# Patient Record
Sex: FEMALE | Race: BLACK OR AFRICAN AMERICAN
[De-identification: names, ages, dates, MRNs, and addresses within clinical notes are randomized per-mention and may not be internally consistent; named-entity substitution may affect disease eponyms.]

---

## 2018-06-19 ENCOUNTER — HOSPITAL ENCOUNTER (EMERGENCY)
Dept: HOSPITAL 68 - ERH | Age: 63
End: 2018-06-19
Payer: SELF-PAY

## 2018-06-19 VITALS — BODY MASS INDEX: 25.71 KG/M2 | HEIGHT: 66 IN | WEIGHT: 160 LBS

## 2018-06-19 VITALS — SYSTOLIC BLOOD PRESSURE: 112 MMHG | DIASTOLIC BLOOD PRESSURE: 83 MMHG

## 2018-06-19 DIAGNOSIS — J06.9: Primary | ICD-10-CM

## 2018-06-19 DIAGNOSIS — J04.0: ICD-10-CM

## 2018-06-19 NOTE — ED THROAT/DENTAL COMPLAINT
History of Present Illness
 
General
Chief Complaint: General Adult
Stated Complaint: "HAVN'T BEEN FEELING GOOOD MY VOICE IS LOST"
Source: patient
Exam Limitations: no limitations
 
Vital Signs & Intake/Output
Vital Signs & Intake/Output
 Vital Signs
 
 
Date Time Temp Pulse Resp B/P B/P Pulse O2 O2 Flow FiO2
 
     Mean Ox Delivery Rate 
 
06/19 1913 98.9 101 18 112/83  97 Room Air  
 
 
 
Allergies
Coded Allergies:
latex (UNKNOWN 06/19/18)
 
Reconcile Medications
Albuterol Sulfate (Ventolin Hfa) 90 MCG HFA.AER.AD   2 PUF INH Q4-6 PRN PRN 
SHORTNESS OF BREATH
Augmentin (Augmentin 500-125 Tablet) 500 MG-125 MG TABLET   1 TAB PO BID PRN uri
Benzonatate (Tessalon Perle) 100 MG CAPSULE   1 CAP PO TID PRN COUGH
Fluticasone Propionate (Flonase Allergy Relief) 50 MCG/ACTUATION SPRAY.SUSP   2 
SPRAY R[place] DAILY PRN CONGESTION
[MAGIC MOUTHWASH] 10 ML PO TID PRN SORE THROAT
     1:1:1
     EQUAL PARTS
     SWISH AND SWALLOW
Methylprednisolone. (Medrol) 4 MG TAB.DS.PK   1 DP PO AD INFLAMMATION
     6 on day 1 then reduce by one tablet daily until gone
 
Triage Note:
PT TO TRIAGE C/O NOT FEELING WELL SINCE SATURDAY,
 RUNNY NOSE, +COUGH, AND LOST VOICE.
Triage Nurses Notes Reviewed? yes
Onset: Gradual
Duration: constant
Timing: recent history
Injury Environment: home
Severity: moderate
HPI:
Patient 63-year-old female with a past medical history of diabetes and 
hypertension who presents emergency room with a 3-day history of sore throat 
nasal congestion nonproductive cough hoarseness in the throat runny nose and 
mild shortness of breath due to coughing,  tactile fevers.  Denies any chest 
pain arm pain jaw pain ear pain difficulty swallowing or breathing
(Satinder Hernandez)
 
Past History
 
Travel History
Traveled to [place] past 21 day No
 
Medical History
Any Pertinent Medical History? see below for history
Neurological: NONE
EENT: NONE
Cardiovascular: NONE
Respiratory: NONE
Gastrointestinal: NONE
Hepatic: NONE
Renal: NONE
Musculoskeletal: NONE
Psychiatric: NONE
Endocrine: PREDIABETES
Blood Disorders: NONE
Cancer(s): NONE
GYN/Reproductive: NONE
 
Surgical History
Surgical History: non-contributory
 
Psychosocial History
What is your primary language English
Tobacco Use: Refused to answer
 
Family History
Hx Contributory? No
(Satinder Hernandez)
 
Review of Systems
 
Review of Systems
Constitutional:
Reports: see HPI. 
EENTM:
Reports: see HPI. 
Respiratory:
Reports: see HPI, cough. 
Cardiovascular:
Reports: no symptoms. 
GI:
Reports: no symptoms. 
Genitourinary:
Reports: no symptoms. 
Musculoskeletal:
Reports: no symptoms. 
Skin:
Reports: no symptoms. 
Neurological/Psychological:
Reports: no symptoms. 
Hematologic/Endocrine:
Reports: no symptoms. 
Immunologic/Allergic:
Reports: no symptoms. 
All Other Systems: Reviewed and Negative
(Satinder Hernandez)
 
Physical Exam
 
Physical Exam
General Appearance: no apparent distress, alert, comfortable
Head: atraumatic
Eyes:
Bilateral: normal appearance, PERRL, EOMI. 
Ears:
Bilateral: canal normal, Tympanic normal. 
Nose: normal inspection, nasal congestion
Mouth/Throat: normal mouth inspection, pharynx normal, voice changes
Neck: normal inspection, no midline tenderness
Cardiovascular/Respiratory: normal breath sounds, normal peripheral pulses, 
regular rate/rhythm, no respiratory distress
Neurologic/Psych: no motor/sensory deficits, awake, alert
Skin: intact, normal color, warm/dry
Comments:
Sinus-mild right maxillary sinus point tenderness
 
Core Measures
ACS in differential dx? No
Sepsis Present: No
Sepsis Focused Exam Completed? No
(Satinder Hernandez)
 
Progress
Differential Diagnosis: epiglottitis, Ludwigs angina, meningitis, odontogenic 
abscess, marry-tonsillar abscess, pharyngeal for. body, stomatitis/gingivitis, 
strep pharyngitis, tooth fracture
Plan of Care:
 Orders
 
 
Procedure Date/time Status
 
THROAT CULTURE W/QUICK STREP 06/19 2012 Active
 
 
Differential diagnosis include pneumonia laryngitis sinusitis
 
Patient on initial presentation was afebrile nontoxic-appearing clear lungs 
auscultation central criteria 0
 
Patient will be treated for concerns of URI and laryngitis
 
No concerns of peritonsillar abscess or Don angina
 no respiratory distress 
 
Strep was negative culture pending
(Satinder Hernandez)
 
Departure
 
Departure
Disposition: HOME OR SELF CARE
Condition: Stable
Clinical Impression
Primary Impression: URI (upper respiratory infection)
Secondary Impressions: Laryngitis
Referrals:
Jessica XIE,Myl (PCP/Family)
 
Additional Instructions:
As discussed begin the prescription of Tessalon Perles for cough Magic mouthwash
for sore throat Medrol Dosepak for inflammation Flonase for congestion Augmentin
for the full course and Ventolin inhaler for shortness of breath.
If symptoms worsen or if you develop any new concerning symptoms return to the 
emergency room if no better in 2 days follow-up with your doctor
Departure Forms:
Customer Survey
General Discharge Information
Prescriptions:
Current Visit Scripts
Augmentin (Augmentin 500-125 Tablet) 1 TAB PO BID PRN uri 
     #14 TAB 
 
Methylprednisolone. (Medrol) 1 DP PO AD  
     #1 DP 
     6 on day 1 then reduce by one tablet daily until gone
 
Fluticasone Propionate (Flonase Allergy Relief) 2 SPRAY RICO DAILY PRN CONGESTION
 
     #1 BOT 
 
Albuterol Sulfate (Ventolin Hfa) 2 PUF INH Q4-6 PRN PRN SHORTNESS OF BREATH 
     #1 INHAL 
 
[MAGIC MOUTHWASH] 10 ML PO TID PRN SORE THROAT 
     #100 ML 
     1:1:1
     EQUAL PARTS
     SWISH AND SWALLOW
 
Benzonatate (Tessalon Perle) 1 CAP PO TID PRN COUGH 
     #15 CAP 
 
 
(Satinder Hernandez)
 
PA/NP Co-Sign Statement
Statement:
ED Attending supervision documentation-
 
[] I saw and evaluated the patient. I have also reviewed all the pertinent lab 
results and diagnostic results. I agree with the findings and the plan of care 
as documented in the PA's/NP's documentation. 
 
[X] I have reviewed the ED Record and agree with the PA's/NP's documentation.
 
[] Additions or exceptions (if any) to the PAs/NP's note and plan are 
summarized below:
[]
 
(Jacek XIE,Ronny PUENTE)

## 2018-07-05 ENCOUNTER — HOSPITAL ENCOUNTER (OUTPATIENT)
Dept: HOSPITAL 68 - ERH | Age: 63
Setting detail: OBSERVATION
LOS: 1 days | End: 2018-07-06
Attending: INTERNAL MEDICINE | Admitting: INTERNAL MEDICINE
Payer: COMMERCIAL

## 2018-07-05 VITALS — HEIGHT: 64 IN | BODY MASS INDEX: 36.95 KG/M2 | WEIGHT: 216.44 LBS

## 2018-07-05 VITALS — DIASTOLIC BLOOD PRESSURE: 70 MMHG | SYSTOLIC BLOOD PRESSURE: 140 MMHG

## 2018-07-05 DIAGNOSIS — I10: ICD-10-CM

## 2018-07-05 DIAGNOSIS — G45.9: ICD-10-CM

## 2018-07-05 DIAGNOSIS — I63.9: Primary | ICD-10-CM

## 2018-07-05 DIAGNOSIS — R29.810: ICD-10-CM

## 2018-07-05 LAB
ABSOLUTE GRANULOCYTE CT: 2.6 /CUMM (ref 1.4–6.5)
BASOPHILS # BLD: 0 /CUMM (ref 0–0.2)
BASOPHILS NFR BLD: 0.5 % (ref 0–2)
EOSINOPHIL # BLD: 0.2 /CUMM (ref 0–0.7)
EOSINOPHIL NFR BLD: 2.7 % (ref 0–5)
ERYTHROCYTE [DISTWIDTH] IN BLOOD BY AUTOMATED COUNT: 13.8 % (ref 11.5–14.5)
GRANULOCYTES NFR BLD: 37.4 % (ref 42.2–75.2)
HCT VFR BLD CALC: 40.1 % (ref 37–47)
LYMPHOCYTES # BLD: 3.7 /CUMM (ref 1.2–3.4)
MCH RBC QN AUTO: 29.8 PG (ref 27–31)
MCHC RBC AUTO-ENTMCNC: 33.1 G/DL (ref 33–37)
MCV RBC AUTO: 90.1 FL (ref 81–99)
MONOCYTES # BLD: 0.4 /CUMM (ref 0.1–0.6)
PLATELET # BLD: 210 /CUMM (ref 130–400)
PMV BLD AUTO: 9.8 FL (ref 7.4–10.4)
RED BLOOD CELL CT: 4.45 /CUMM (ref 4.2–5.4)
WBC # BLD AUTO: 7 /CUMM (ref 4.8–10.8)

## 2018-07-05 PROCEDURE — G0378 HOSPITAL OBSERVATION PER HR: HCPCS

## 2018-07-05 NOTE — ULTRASOUND REPORT
EXAMINATION:
US DUPLEX CAROTID AND VERTEBRAL
 
CLINICAL INFORMATION:
Slurred speech
 
COMPARISON:
None
 
TECHNIQUE:
Real-time ultrasound and Doppler techniques (integrating B-mode 2D vascular
images, Doppler spectral analysis and color flow Doppler imaging) were
utilized to interrogate the extracranial carotid and vertebral arteries
bilaterally. The degree of stenosis determined by criteria similar to NASCET.
 
 
FINDINGS:
 
No measurable plaque formation.
 
INTERNAL CAROTID PEAK SYSTOLIC VELOCITY:
 
RIGHT: 1:15 cm/sec      LEFT: 85 cm/sec
 
ICA PEAK END-DIASTOLIC VELOCITY:
 
RIGHT: 33                   LEFT: 33
 
ADDITIONAL FINDINGS: There is antegrade flow in the vertebral arteries.
 
IMPRESSION:
No hemodynamically significant stenosis.
 
No focal stenosis

## 2018-07-05 NOTE — CT SCAN REPORT
EXAMINATION:
CT HEAD WITHOUT CONTRAST
 
CLINICAL INFORMATION:
Intracranial hemorrhage, mass. Slurred speech
 
COMPARISON:
None
 
TECHNIQUE:
Contiguous axial imaging was performed from the skull base to vertex without
intravenous administration of contrast.
 
DLP:
615 mGy-cm
 
FINDINGS:
There is no evidence of acute intracranial hemorrhage or territorial
infarction. No abnormal mass effect or midline shift is seen. Gray to white
matter differentiation is well preserved. No extra-axial fluid collections
are identified.
 
The ventricles are normal in size. There is no abnormal attenuation within
the brain parenchyma. The osseous structures and soft tissues are normal. The
mastoid air cells and visualized portions of the paranasal sinuses are well
aerated.
 
IMPRESSION:
No acute intracranial pathology.

## 2018-07-05 NOTE — HISTORY & PHYSICAL
Paty Ocampo 07/05/18 1743:
General Information and HPI
Source of Information: patient
Exam Limitations: no limitations
History of Present Illness:
Patient is a 63-year-old female with a past medical history of hypertension, non
-insulin-dependent diabetes mellitus.  She presented to the ED because as per 
her daughter her speech was slurred.  The slurring of the speech was noticed 
first on Monday, July 2 from 5 to 6 PM & Tuesday all day.  Wednesday she felt 
that her speech improved.
Patient also noticed tiredness, numbness, pins and needles in her left arm and 
hand.  Her little finger of her left hand had no sensation.  The sensory 
sensation improved on Tuesday but still not back to normal.
Patient reports an upper respiratory tract infection (laryngitis) 2 weeks ago. 
She used Augmentin 500/250 for 14 days and prednisone. 
 
On review of symptoms, she was feeling very tired.  She has been eating and 
drinking well.No other complaints. 
 
 
 
 
 
Allergies/Medications
Allergies:
Coded Allergies:
latex (UNKNOWN 06/19/18)
morphine (UNKNOWN 07/05/18)
 
Home Med list
Albuterol Sulfate (Proair Hfa) 90 MCG HFA.AER.AD   2 PUF INH Q4-6 PRN PRN 
SHORTNESS OF BREATH  (Reported)
Aspirin (Ecotrin*) 81 MG TABLET.DR   81 MG PO DAILY heart
Atenolol 25 MG TABLET   1 TAB PO BID HEART  (Reported)
Atorvastatin Calcium 80 MG TABLET   80 MG PO 1700 heart
Cyclobenzaprine HCl 5 MG TABLET   0.5 TAB PO TIDPRN PRN MUSCLE RELAXER  (
Reported)
Liraglutide (Victoza 2-Amadeo) 0.6 MG/0.1 ML (18 MG/3 ML) PEN.INJCTR   1.2 MG SC 
DAILY DIABETES  (Reported)
Mometasone Furoate (Nasonex) 50 MCG SPRAY.PUMP   1 SPRAY NASB DAILY PRN 
BREATHING  (Reported)
Ranitidine HCl (Zantac) 150 MG TABLET   1 TAB PO BID PRN GI  (Reported)
 
 
Past History
 
Travel History
Traveled to [place] past 21 day No
 
Medical History
Neurological: NONE
EENT: NONE
Cardiovascular: hypertension
Respiratory: NONE
Gastrointestinal: NONE
Hepatic: NONE
Renal: NONE
Musculoskeletal: NONE
Psychiatric: NONE
Endocrine: PREDIABETES
Blood Disorders: NONE
Cancer(s): NONE
GYN/Reproductive: NONE
 
Surgical History
Surgical History: non-contributory
 
Past Family/Social History
 
Family History
Relations & Conditions if any
Relation not specified for:
  Diabetes mellitus in father
  Diabetes mellitus in son
  Stroke or transient ischemic attack in mother
 
 
Psychosocial History
ETOH Use: denies use
 
Review of Systems
 
Review of Systems
Constitutional:
Reports: see HPI. 
 
Exam & Diagnostic Data
Last 24 Hrs of Vital Signs/I&O
 Vital Signs
 
 
Date Time Temp Pulse Resp B/P B/P Pulse O2 O2 Flow FiO2
 
     Mean Ox Delivery Rate 
 
07/05 1738 98.3 72 18 124/65  97   
 
07/05 1509  69 15 118/63  99   
 
07/05 1423 98.0 84 16 156/74  99 Room Air  
 
 
 
 
Physical Exam
General Appearance Alert, Oriented X3, Cooperative
Skin No Rashes, No Breakdown, No Significant Lesion
Skin Temp/Moisture Exam: Cool/Dry
Sepsis Skin Exam (color): Normal for Ethnicity
HEENT Atraumatic, PERRLA, lt eye is prosthetic
Neck Supple
Cardiovascular Regular Rate, Normal S1, Normal S2
Lungs Clear to Auscultation, Normal Air Movement
Abdomen Normal Bowel Sounds, Soft, No Tenderness
Neurological Normal Speech, Strength at 5/5 X4 Ext, Cranial Nerves 3-12 NL
Extremities No Clubbing, No Cyanosis, No Edema, No drift
 
Assessment/Plan
Assessment:
Patient is a 63-year-old female with a past medical history of hypertension, non
-insulin-dependent diabetes mellitus.  She presented to the ED because as per 
her daughter her speech was slurred.  No slurring of the speech was noticed 
first on Monday, July 2 from 5 to 6 PM & Tuesday all day.  Wednesday she felt 
that her speech improved.
Patient also noticed tiredness, numbness, pins and needles in her left arm and 
hand.  Her little finger of her left hand had no sensation.  This sensation 
improved on Tuesday but still not back to normal.
 
In the ED her vitals were temp 98, pulse 84, respirations 16, /74, pulse 
ox 99% on room air.
EKG on admission: Deep R waves in anterior and inferior leads, 
CT head: Unremarkable
Pertinent labs: WBC 7, hemoglobin 13.3, hematocrit 40.1, platelets 210, sodium 
144, potassium 3.5, chloride 111, bicarb 22, BUN 13, creatinine 1.2, troponin 
less than 0.01 on admission. 
 
Problems: 
-Stroke/TIA ( MCA vs lacunar)
HTN
DM
? KARENA- 
 
Plan:
-Place in observation in tele
-neuro checks q2 hrs for 24 hrs
-cardiac monitor for arrythmias 
-trend trop and EKG
-start ASA and Statin daily
-as symptoms started more than 48hrs no need for permissive HTN, we will contine
atenolol and hold HTCZ in view Creatinine, restart if it improves.
-f/up ECHO, MRI and Carotid US
-f/up lipid panel, HA1c, TSH 
-neuro consult placed
-PT/OT to evaluate and treat
-passed bedside swallow 
-diet: diabetic
-dvt Pxx: ALP
-code status: Full code
 
 
Core Measures/Misc (9/17)
 
Acute Coronary Syndrome
ACS Diagnosis: No
 
Congestive Heart Failure
Congestive Heart Failure Diagnosis No
 
Cerebrovascular Accident
CVA/TIA Diagnosis: Yes
NIH Stroke Scale:
Total 1
Date Last Known Well: 07/02/18
Time Last Known Well: 1700
Symptom Start Date: 07/02/18
Symptom Start Time: 1700
tPA Risk/Benefit discussion
I have discussed the risks, benefits, and alternatives of Alteplase treatment 
including:
- If given promptly, can resolve or have major improvement in stroke symptoms.
- Bleeding (hemorrhage) is the most common risk that can occur.
- Bleeding may occur into the brain and cause~long term serious disability~
including death - this is rare, affecting about 1% of patients.
- Alternative treatments with proven benefit for patients with stroke include 
aspirin and care in a specialized unit where staff members pay careful attention
to a variety of basic aspects of care.
 
 
 
tPA given? No
Reason tPA not ordered Medical Contraindication (out of window period)
Swallow Evaluation Pass
Current/Past Hx AFib/AFlutter No
No Anticoagulant d/t Medical Contraindication
 
VTE (View Protocol)
VTE Risk Factors Age>40
No Mechanical VTE Prophylaxis d/t N/A MechProphylax Ordered
No VTE Pharm Prophylaxis d/t NA PharmProphylax ordered
 
Sepsis (View protocol)
Sepsis Present: No
If YES complete Sepsis Event Note If YES complete Sepsis Event Note
 
Shalom Ugalde 07/05/18 1945:
Assessment/Plan
 
As Ranked By This Provider
Problem List:
 1. CVA (cerebral vascular accident)
   Qualifiers
 CVA mechanism: unspecified Qualified Code: I63.9 - Cerebral infarction, 
unspecified
 
 
Core Measures/Misc (9/17)
 
Sepsis (View protocol)
If YES complete Sepsis Event Note If YES complete Sepsis Event Note
 
Resident Review Statement
Resident Statement: examined this patient, discussed with intern, discussed with
family
Other Findings:
63 year old woman, legally blind in left eye s/p prosthesis, with pmh 
significant for HTN, NIDDM, GERD,brought in for evaluation of slurred speech.  4
days ago in the the evening when she was tlking to her daughter on the phone, 
her daughter noticed that her speech was slurred, associated with Left arm 
heaviness and numbness. Her cousin who was at bedside saw her two days later did
notice any facial droop or any changes. On interview she endorsed left pinky 
numbness.ROS was pos for fatigue. Denies fevers, chills, shortness of breath, 
palpitations.
 
Two weeks prior she was seen in [facility] for URI and was prescribed augmentin 
and predn. taper.  She attributed her symptoms to her illness.  Reports 
compliance with her medication and monitors her BP at home and states that they 
are usually well controlled.  
 
 
In the ED her vitals were temp 98, pulse 84, respirations 16, /74, pulse 
ox 99% on room air. Examination:, slight slurring of speech, CN II-12 grossly 
normal, no facial droop,pronator drift  noted, decreased sensation to light 
touch on left side of face and arm. Power 5x5 in all extremities, no cerebellar 
signs noted, reflexes +2, babinski equivocal. Rest of examination unremarkable. 
 
 
EKG on admission: Deep R waves in anterior and inferior leads, 
CT head: Unremarkable
Pertinent labs: WBC 7, hemoglobin 13.3, hematocrit 40.1, platelets 210, sodium 
144, potassium 3.5, chloride 111, bicarb 22, BUN 13, creatinine 1.2 (baseline 
not known), troponin less than 0.01 on admission. 
 
Problem List:
Stroke/TIA ( MCA vs lacunar)
HTN
DM
? KARENA- 
 
 
plan:
Place as obs tele, neuro checks q2 hrs for 24 hrs,monitor for arrythmias, trend 
trop EKG
start ASA and STatin daily
as symptoms started more than 48hrs no need for permissive HTN, will contine 
atenolol and hold HTCZ in view Creatinine, restart if it improves.
f/up ECHO, MRI and Carotid US
f/up lipid panel, HA1c, TSH, 
neuro consult placed
PT/OT to evaluate and treat
passed bedside swallow, diabetic diet
dvt Pxx
full code
 
 
Magdalene Mercado MD 07/06/18 0047:
Core Measures/Misc (9/17)
 
Sepsis (View protocol)
If YES complete Sepsis Event Note If YES complete Sepsis Event Note
 
Attending MD Review Statement
 
Attending Statement
Attending MD Statement: examined this patient, discuss w/resident/PA/NP, agreed 
w/resident/PA/NP, reviewed EMR data (avail)
Attending Assessment/Plan:
63F PMH HTN, T2DM, with slurred speech that started 4 days ago and has been 
steadily improving, as well as left arm heaviness and paresthesia for the past 3
days.  Symptoms started abruptly, were noted by family members.  No speech 
slurring is noted today.  Still reports paresthesia of the left arm.  On exam, 
CN intact, strength intact, sensation intact.  EKG NSR.  Head CT negative.
 
1. Acute ischemic CVA
 
Plan
- Admit to telemetry
- MRI head
- Carotid doppler, echocardiogram
- Check lipids, HbA1c
- ASA, statin
- OT eval
- Continue home medications
- DVT PPx
 
- Continue home medications
- DVT PPx

## 2018-07-06 VITALS — DIASTOLIC BLOOD PRESSURE: 60 MMHG | SYSTOLIC BLOOD PRESSURE: 118 MMHG

## 2018-07-06 VITALS — DIASTOLIC BLOOD PRESSURE: 50 MMHG | SYSTOLIC BLOOD PRESSURE: 102 MMHG

## 2018-07-06 NOTE — PATIENT DISCHARGE INSTRUCTIONS
Discharge Instructions
 
General Discharge Information
You were seen/treated for:
STROKE
Watch for these problems:
In case of dizziness, numbness, slurring of speech, headache, chest pain please 
go to the nearest emergency room
Special Instructions:
#Please follow-up with your primary care provider within 1-2 weeks of discharge 
and let them know about your recent admission at [facility].
#Please have an ultrasound of your heart (echocardiogram) as an outpatient 
within 1-2 weeks of discharge
#Please see a pulmonologist for your cough
#Please follow-up with the neurologist within 1-2 weeks of discharge.
#Please note that 1 of your medication hydrochlorothiazide has been stopped 
during this hospital admission.  Please discuss with your primary care physician
regarding restarting it.
 
Diet
Recommended Diet: Heart Healthy
 
Activity
Activity Self Limited: No
 
Acute Coronary Syndrome
 
Inclusion Criteria
At DC or during hospital stay patient has or had the following:
ACS DIAGNOSIS No
 
Discharge Core Measures
Meds if any: Prescribed or Continued at Discharge
Meds if any: NOT Prescribed or Continued at Discharge
 
Congestive Heart Failure
 
Inclusion Criteria
At DC or during hospital stay patient has or had the following:
CHF DIAGNOSIS No
 
Discharge Core Measures
Meds if any: Prescribed or Continued at Discharge
Meds if any: NOT Prescribed or Continued at Discharge
 
Cerebrovascular accident
 
Inclusion Criteria
At DC or during hospital stay patient has or had the following:
CVA/TIA Diagnosis Yes
 
Discharge Core Measures
Meds if any: Prescribed or Continued at Discharge
Antithrombotic No
Statin (required if LDL =>70) Yes
Anticoagulant No
Meds if any: NOT Prescribed or Continued at Discharge
 
Venous thromboembolism
 
Inclusion Criteria
VTE Diagnosis No
VTE Type NONE
VTE Confirmed by (Test) NONE
 
Discharge Core Measures
- Per Current guidelines, there needs to be overlap
- treatment for the first 5 days of Warfarin therapy.
- If discharged on Warfarin prior to 5 days of
- overlap therapy, the patient will need to be
- assessed for post discharge needs including
- *Post discharge parental anticoagulation
- *Warfarin and/or parental anticoagulation education
- *Follow up date to check INR post discharge
At least 5 days overlap therapy as Inpatient No
Meds if any: Prescribed or Continued at Discharge
Note: Overlap Therapy is Warfarin and Anticoagulant
Meds if any: NOT Prescribed or Continued at Discharge

## 2018-07-06 NOTE — PN- HOUSESTAFF
Paty Ocampo 18 0654:
Subjective
Follow-up For:
Slurred speech
Weakness and numbness of left arm.
Complaints: no complaints
Tele-Events Since Last Visit:
Normal sinus rhythm, sinus bradycardia.  Heart rate 54-62.
Subjective:
Patient slept well.  No complaints/acute events.
 
Review of Systems
Constitutional:
Reports: see HPI. 
 
Objective
Last 24 Hrs of Vital Signs/I&O
 Vital Signs
 
 
Date Time Temp Pulse Resp B/P B/P Pulse O2 O2 Flow FiO2
 
     Mean Ox Delivery Rate 
 
 0952  60  102/50     
 
/ 0703 98.7 60 18 102/50  97 Room Air  
 
 2312 97.8 56 18 140/70  97 Room Air  
 
 2238  58  144/72     
 
 1738 98.3 72 18 124/65  97   
 
 1509  69 15 118/63  99   
 
 
 Intake & Output
 
 
  1600  0800  0000
 
Intake Total 550 100 100
 
Output Total   
 
Balance 550 100 100
 
    
 
Intake, Oral 550 100 100
 
Patient   216 lb
 
Weight   
 
Weight   Bed scale
 
Measurement   
 
Method   
 
 
 
 
Physical Exam
General Appearance: Alert, Oriented X3, Cooperative, No Acute Distress
Skin: No Rashes, No Breakdown, No Significant Lesion
Skin Temp/Moisture Exam: Cool/Dry
HEENT: Atraumatic
Cardiovascular: Regular Rate, Normal S1, Normal S2, No Murmurs
Lungs: Clear to Auscultation, Normal Air Movement
Abdomen: Normal Bowel Sounds, Soft, No Tenderness
Neurological: Normal Gait (She has no residual deficits), Normal Speech, 
Strength at 5/5 X4 Ext, Normal Tone, Sensation Intact, Cranial Nerves 3-12 NL
Extremities: No Clubbing, No Cyanosis, No Edema, Normal Pulses
 
Assessment/Plan
Assessment:
Patient is a 62-year-old -American female who presented to us with 
slurring of speech for 1 week and weakness and numbness of the left arm for 1 
week.  She has a past medical history of non-insulin-dependent diabetes mellitus
and hypertension.
 
Her vitals today were temperature 98.7, pulse of 60, respiratory rate 18, 10 2 x
50, 97% on room air.  On the cardiac monitor she was in normal sinus rhythm and 
sinus bradycardia.  Heart rate 54-62.
Her head CT did not show any apparent pathology.  Troponins were negative(0.01
0.010.01).  
Carotid ultrasound was normal.
 
Patient is in observation and plan is to discharge to home today
 
Plan: 
-Plan is to discharge today.

-Continue Aspirin and statin
-keep her bp in check and manage it well



brain Without contrast.  MRI brain was not done in the hospital because of the 
prosthetic eye.

Problem List:
 1. CVA (cerebral vascular accident)
 
Pain Ratin
Pain Location:
none
Pain Goal: Remain pain free
Pain Plan:
remain pain free
Tomorrow's Labs & Rationales:
Adelita Wilkerson MD 18 1143:
Attending MD Review Statement
 
Attending Statement
Attending MD Statement: examined this patient, discuss w/resident/PA/NP, agreed 
w/resident/PA/NP, reviewed EMR data (avail), discussed with nursing, discussed 
with case mgmt, amended to note
Attending Assessment/Plan:
Patient seen and examined.  Lying comfortably in bed not in any acute distress. 
No new complaints.  Speech is intact.  She has no gross focal deficits on 
examination.  Evaluation by the neurology service impression.  Given the 
chronicity of her symptoms prior to presentation she appears to have suffered a 
small stroke. Her Neurologic deficit has significantly resolved.
 
 
Plan:
-Follow-up echocardiogram.
-Patient will be discharged on aspirin and statin therapy.  She is to continue 
her atenolol and hydrochlorothiazide for blood pressure control.
-Apparently the radiology service is unable to confirm if patient's prostatic I 
is compatible with the current MRI machine.  In the absence of any acute 
neurologic deficits she may be discharged to the event no acutely concerning 
pathologies on her echocardiogram.  She will follow-up in the outpatient with 
her primary care provider.
-She reports having a chronic cough that has been going on for well over a 
month.  It is a nonproductive cough.  She denies any chest pain or shortness of 
breath.  Denies any smoking history.  She is not on an ACE inhibitor.  She has 
seen her primary care provider about this.  Cough has not resolved with use of 
antitussive therapy.  Examination she has been entry bilaterally.  Lungs are 
clear bilaterally.  Recommend follow-up with the pulmonology service as an 
outpatient.

## 2018-07-06 NOTE — EVENT NOTE
Event Note
Event Note:
Patient was seen by neurology who suggested send her home with follow-up with 
neurology and primary care physician as outpatient.  Patient did get her MRI 
done in view of prosthetic eye.  This was informed to the neurologist who 
suggested to do the MRI as outpatient.  Patient will bring her records from her 
eye doctor and do MRI as outpatient.  We will send her home with aspirin and 
statin.

## 2018-07-06 NOTE — CONS- NEUROLOGY
General Information and HPI
 
Consulting Request
Date of Consult: 07/06/18
Requested By:
Adelita Hoang MD
 
History of Present Illness:
63-year-old hypertensive, non-insulin-dependent diabetic female presented 4 days
ago with slurred speech, mild left facial droop and paresthesias of the left 
upper extremity.  A family member noted her speech disturbance and ultimately 
the patient presented for medical evaluation.  When seen at the [facility]
, she was clearly out of the window for possible TPA.  In addition, her NIH  
stroke scale was low.  She has been stable since admission she is swallowing 
without difficulty and ambulating without appliance.  Initial workup shows a 
negative carotid ultrasound and a benign CT of the brain.
 
Allergies/Medications
Allergies:
Coded Allergies:
latex (UNKNOWN 06/19/18)
morphine (UNKNOWN 07/05/18)
 
Home Med List:
Albuterol Sulfate (Proair Hfa) 90 MCG HFA.AER.AD   2 PUF INH Q4-6 PRN PRN 
SHORTNESS OF BREATH  (Reported)
Atenolol 25 MG TABLET   1 TAB PO BID HEART  (Reported)
Cyclobenzaprine HCl 5 MG TABLET   0.5 TAB PO TIDPRN PRN MUSCLE RELAXER  (
Reported)
Hydrochlorothiazide 25 MG TABLET   1 TAB PO DAILY WATER RETENTION  (Reported)
Liraglutide (Victoza 2-Amadeo) 0.6 MG/0.1 ML (18 MG/3 ML) PEN.INJCTR   1.2 MG SC 
DAILY DIABETES  (Reported)
Mometasone Furoate (Nasonex) 50 MCG SPRAY.PUMP   1 SPRAY NASB DAILY PRN 
BREATHING  (Reported)
Ranitidine HCl (Zantac) 150 MG TABLET   1 TAB PO BID PRN GI  (Reported)
 
 
Review of Systems
Review of Systems:
Notable for dysarthria and paresthesia of the left upper extremity.  She had a 
recent URI several weeks back.  There has been no fever, chills, rash, weight 
loss, diplopia, dysphagia, chest pain, cough, vomiting, vertigo, gait ataxia or 
abnormal bleeding
 
Past History
 
Travel History
Traveled to [place] past 21 day No
 
Medical History
Blood Transfusion Hx: No
Neurological: NONE
EENT: NONE
Cardiovascular: hypertension
Respiratory: NONE
Gastrointestinal: NONE
Hepatic: NONE
Renal: NONE
Musculoskeletal: NONE
Psychiatric: NONE
Endocrine: PREDIABETES
Blood Disorders: NONE
Cancer(s): NONE
GYN/Reproductive: NONE
 
Surgical History
Surgical History: non-contributory
 
Family History
Relations & Conditions If Any:
Relation not specified for:
  Diabetes mellitus in father
  Diabetes mellitus in son
  Stroke or transient ischemic attack in mother
 
 
Psychosocial History
Smoking Status: Never Smoked
ETOH Use: denies use
 
Exam & Diagnostic Data
Vital Signs and I&O
Vital Signs
 
 
Date Time Temp Pulse Resp B/P B/P Pulse O2 O2 Flow FiO2
 
     Mean Ox Delivery Rate 
 
07/06 0952  60  102/50     
 
07/06 0703 98.7 60 18 102/50  97 Room Air  
 
07/05 2312 97.8 56 18 140/70  97 Room Air  
 
07/05 2238  58  144/72     
 
07/05 1738 98.3 72 18 124/65  97   
 
07/05 1509  69 15 118/63  99   
 
07/05 1423 98.0 84 16 156/74  99 Room Air  
 
 
 Intake & Output
 
 
 07/06 1600 07/06 0800 07/06 0000
 
Intake Total  100 100
 
Output Total   
 
Balance  100 100
 
    
 
Intake, Oral  100 100
 
Patient   216 lb
 
Weight   
 
Weight   Bed scale
 
Measurement   
 
Method   
 
 
Pleasant, middle-aged, obese female in no acute distress.  The head was 
normocephalic and atraumatic.  Higher cortical function was intact.  Speech was 
fluent.  The right pupil was 3 mm and reactive.  She had a prosthetic left eye 
with associated left-sided ptosis.  Facial sensation was intact.  There was 
minimal asymmetry to grimace with flattening of the left nasolabial fold.  
Tongue was midline; there was no dysarthria.  Motor examination showed no drift 
the upper extremities.  There was no focal or lateralizing weakness.  Deep 
tendon reflexes were symmetric.  Plantar responses were flexor.  Fine finger 
movements and rapid alternating movements were performed normally.  The sensory 
examination was normal to light touch.  Her gait was narrow based and steady.
 
Assessment/Plan
Assessment:
On clinical grounds, Ms. Mayen has likely suffered a small right hemispheric 
infarct resulting in transient dysarthria, paresthesia of the left upper 
extremity and minimal facial asymmetry.  I expect her to recover fully.
Recommendations:
The patient should undergo echocardiography and MRI of the brain without 
contrast in hopes of further documenting and localizing the lesion.  She should 
be maintained on aspirin and a statin going forward.  I have suggested that she 
be more vigilant in regard to her blood pressure with close follow-ups by her 
private medical doctor, Dr. Lopez.  She will not require any physical or 
occupational therapy after discharge.
 
Follow-up with neurology would be on an as-needed basis.
 
Please feel free to call with any further questions.
 
 
Consult Acknowledgment
- Thank you for your consult request.

## 2018-07-06 NOTE — ADMISSION CERTIFICATION
Admission Certification
 
Certification Statement
- As attending physician, I certify that at the time of
- admission, based on clinical presentation, severity of
- symptoms, need for further diagnostic testing and
- therapeutic interventions, and risk of adverse outcomes
- without in-hospital treatment, in my clinical assessment,
- this patient requires an acute hospital stay for a minimum
- of two nights or longer. I have also considered psychsocial
- factors such as support system, advanced age, financial
- issues, cognitive issues, and failed out-patient treatments,
- past re-admission history, safety of patient, and lack of
- compliance as applicable.
Specific rationale supporting this admission is:
Acute CVA

## 2018-07-06 NOTE — RADIOLOGY REPORT
EXAMINATION:
XR CHEST
 
CLINICAL INFORMATION:
Stroke/chronic cough.
 
COMPARISON:
None
 
TECHNIQUE:
2 views of the chest were obtained.
 
FINDINGS:
Both lungs are symmetrically expanded, appear clear. The cardiomediastinal
silhouette is within normal limit. There is no pleural effusion or
pneumothorax present. The visualized upper abdomen is unremarkable. Mild
multilevel degenerative changes are noted in the spine.
 
 
 
IMPRESSION:
No acute cardiopulmonary disease.

## 2018-07-06 NOTE — DISCHARGE SUMMARY
Hospital Course
Allergies:
Coded Allergies:
latex (UNKNOWN 06/19/18)
morphine (UNKNOWN 07/05/18)
 
 
Discharge Instructions
 
Medications at Discharge
Discharge Medications:
Stop taking the following medications:
Hydrochlorothiazide (Hydrochlorothiazide) 25 MG TABLET ORAL DAILY 
 
Continue taking these medications:
Liraglutide (Victoza 2-Amadeo) 0.6 MG/0.1 ML (18 MG/3 ML) PEN.INJCTR
    1.2 Milligram Inject into fatty tissue DAILY
    Comments:
       NOT GIVEN IN HOSPITAL.
 
Atenolol (Atenolol) 25 MG TABLET
    1 Tablet ORAL TWICE DAILY
    Comments:
       Last Taken: 07/06/18
             Time: 0900 AM
 
Ranitidine HCl (Zantac) 150 MG TABLET
    1 Tablet ORAL TWICE DAILY as needed for GI
    Comments:
       NOT GIVEN IN HOSPITAL.
 
Albuterol Sulfate (Proair Hfa) 90 MCG HFA.AER.AD
    2 Puff Inhale through mouth EVERY 4-6 HOURS AS NEEDED as needed for 
SHORTNESS OF BREATH
    Comments:
       NOT GIVEN IN HOSPITAL.
 
Mometasone Furoate (Nasonex) 50 MCG SPRAY.PUMP
    1 Spray Both sides of nose DAILY as needed for BREATHING
    Comments:
       NOT GIVEN IN HOSPITAL.
 
Cyclobenzaprine HCl (Cyclobenzaprine HCl) 5 MG TABLET
    0.5 Tablet ORAL THREE TIMES A DAY AS NEEDED as needed for MUSCLE RELAXER
    Comments:
       NOT GIVEN IN HOSPITAL.
 
Start taking the following new medications:
Atorvastatin Calcium (Atorvastatin Calcium) 80 MG TABLET
    80 Milligram ORAL 5 PM
    Qty = 30
    No Refills
    Comments:
       NOT GIVEN IN HOSPITAL.
 
Aspirin (Ecotrin*) 81 MG TABLET.
    81 Milligram ORAL DAILY
    Qty = 30
    No Refills
    Comments:
       Last Taken: 07/06/18
             Time: 0900 AM

## 2018-07-07 NOTE — ECHOCARDIOGRAM REPORT
ZARINA ANDINO 
 
 Age:    63     :    1955      Gender:     F 
 
 MRN:    [id number] 
 
 Exam Date:     2018  
                13:36 
 
 Exam Location:  
 [facility] 
 
 Ht (in):     64      Wt (lb):      207     BSA:    2.10 
 
 BP:          124     /     65 
 
 Ordering Physician:        Shalom Ugalde MD 
 
 Referring Physician:       Shalom Ugalde MD 
 
 Technologist:              Greyson [facility]a [facility] 
 
 Room Number:               174-02 
 
 Indications:       CVA 
 
 Rhythm:                 Sinus 
 
 Technical Quality:      fair 
 
 FINDINGS 
 
 Left Ventricle 
 Normal left ventricular size with mild left ventricular hypertrophy.   
 Normal systolic function with no obvious regional wall motion  
 abnormalities.  Normal left ventricular diastolic filling pattern  
 for age.  The ejection fraction is visually estimated at 70%. 
 
 Right Ventricle 
 The right ventricle is normal in size and function. 
 
 Right Atrium 
 The right atrium is normal in size. 
 
 Left Atrium 
 The left atrium is mildly enlarged.  The interatrial septum is  
 intact. 
 
 Mitral Valve 
 The mitral valve is normal in structure and function.  There is mild  
 mitral regurgitation. 
 
 Aortic Valve 
 Structurally normal aortic valve without significant sclerosis or  
 stenosis.  There is no aortic regurgitation. 
 
 Tricuspid Valve 
 The tricuspid valve is normal in structure and function.  There is  
 mild tricuspid regurgitation.  Pulmonary artery systolic pressure is  
 normal. 
 
 Pulmonic Valve 
 Structurally normal pulmonic valve.  There is no pulmonic  
 regurgitation. 
 
 Pericardium 
 Normal pericardium without effusion.  No pleural effusion. 
 
 Great Vessels 
 Normal aortic root dimension.  The aortic arch and great vessels are  
 well seen and are normal. 
 
 CONCLUSIONS 
 1. Normal EF of 70%. 
 2. Mild left ventricular hypertrophy. 
 3. Mild left atrial enlargement. 
 4. Mild mitral regurgitation. 
 5. Mild tricuspid regurgitation. 
 
 Justin Christina M.D. 
 (Electronically Signed) 
 Final Date:      2018  
                  07:06 
 
 MEASUREMENTS  (Male / Female) Normal Values 
 
 2D ECHO 
 LV Diastolic Diameter PLAX        4.2 cm                4.2 - 5.9 / 3.9 - 5.3 
cm 
 LV Systolic Diameter PLAX         2.4 cm                2.1 - 4.0 cm 
 LV Fractional Shortening PLAX     42.9 %                25 - 46  % 
 LV Ejection Fraction 2D Teich     74.3 %                 
 IVS Diastolic Thickness           1.4 cm                 
 LVPW Diastolic Thickness          1.2 cm                 
 LV Relative Wall Thickness        0.6                    
 LVOT Diameter                     1.9 cm                 
 Aortic Root Diameter              2.3 cm                 
 LA Systolic Diameter LX           4.2 cm                3.0 - 4.0 / 2.7 - 3.8 
cm 
 LV Ejection Fraction MOD BP       62.5 %                >= 55  % 
 LV Cardiac Index MOD BP           1475.9 cm/minm      
 LV Diastolic Length 4C            6.8 cm                6.9 - 10.3 cm 
 LV Diastolic Area 4C              27.1 cm               
 LV Diastolic Volume MOD 4C        88.0 cm               
 LV Ejection Fraction MOD 4C       68.2 %                 
 LV Stroke Volume MOD 4C           60.0 cm               
 LV Cardiac Index MOD 4C           1771.1 cm/minm      
 LV Systolic Length 4C             5.0 cm                 
 LV Systolic Area 4C               12.9 cm               
 LV Systolic Volume MOD 4C         28.0 cm               
 LV Ejection Fraction MOD 2C       57.4 %                 
 LV Cardiac Index MOD 2C           1151.2 cm/minm      
 LV Diastolic Volume 4C AL         91.8 cm              85 - 139 / 69 - 109 cm
 
 LV Systolic Volume 4C AL          28.1 cm               
 LV Ejection Fraction 4C AL        69.4 %                 
 LV Stroke Volume 4C AL            63.7 cm               
 LV Cardiac Index 4C AL            1879.7 cm/minm      
 LV Ejection Fraction 2C AL        58.5 %                 
 LV Cardiac Index 2C AL            1182.9 cm/minm      
 LA Volume                         54.0 cm              18 - 58 / 22 - 52 cm 
 Ascending Aorta Diameter          2.7 cm                 
 
 DOPPLER 
 AV Peak Velocity                  155.0 cm/s             
 AV Peak Gradient                  9.6 mmHg               
 LVOT Peak Velocity                97.7 cm/s              
 LVOT Peak Gradient                3.8 mmHg               
 AV Area Cont Eq pk                1.8 cm                
 Mitral E Point Velocity           103.0 cm/s             
 Mitral A Point Velocity           97.7 cm/s              
 Mitral E to A Ratio               1.1                    
 MV Deceleration Time              218.0 ms               
 TR Peak Velocity                  251.0 cm/s             
 TR Peak Gradient                  25.2 mmHg              
 PV Peak Velocity                  102.0 cm/s             
 PV Peak Gradient                  4.2 mmHg               
 LV E' Lateral Velocity            9.4 cm/s               
 Mitral E to LV E' Lateral Ratio   11.0                   
 LV E' Septal Velocity             7.0 cm/s               
 Mitral E to LV E' Septal Ratio    14.7

## 2024-01-08 NOTE — ED NEURO DEFICIT/STROKE
History of Present Illness
 
General
Chief Complaint: General Adult
Stated Complaint: "MY DAUGHTER SAYS MY SPEECH IS SLURRED"
Source: patient, family
Exam Limitations: no limitations
 
Vital Signs & Intake/Output
Vital Signs & Intake/Output
 Vital Signs
 
 
Date Time Temp Pulse Resp B/P B/P Pulse O2 O2 Flow FiO2
 
     Mean Ox Delivery Rate 
 
 1738 98.3 72 18 124/65  97   
 
 1509  69 15 118/63  99   
 
 1423 98.0 84 16 156/74  99 Room Air  
 
 
 
Allergies
Coded Allergies:
latex (UNKNOWN 18)
morphine (UNKNOWN 18)
 
Reconcile Medications
Albuterol Sulfate (Proair Hfa) 90 MCG HFA.AER.AD   2 PUF INH Q4-6 PRN PRN 
SHORTNESS OF BREATH  (Reported)
Atenolol 25 MG TABLET   1 TAB PO BID HEART  (Reported)
Cyclobenzaprine HCl 5 MG TABLET   0.5 TAB PO TIDPRN PRN MUSCLE RELAXER  (
Reported)
Hydrochlorothiazide 25 MG TABLET   1 TAB PO DAILY WATER RETENTION  (Reported)
Liraglutide (Victoza 2-Amadeo) 0.6 MG/0.1 ML (18 MG/3 ML) PEN.INJCTR   1.2 MG SC 
DAILY DIABETES  (Reported)
Mometasone Furoate (Nasonex) 50 MCG SPRAY.PUMP   1 SPRAY NASB DAILY PRN 
BREATHING  (Reported)
Ranitidine HCl (Zantac) 150 MG TABLET   1 TAB PO BID PRN GI  (Reported)
 
Triage Note:
PT TO ED C/O SLURRED SPEECH SINCE MONDAY. EQUAL
HAND GRASPS, PT NOTED WITH LT FACIAL DROOP AND
DECREASED SENSATION. ALSO REPORTS THAT SHE HAS HAD
WEAKNESS TO HER LEFT SIDE.
Triage Nurses Notes Reviewed? yes
Onset: Abrupt
Duration: day(s):, constant, continues in ED
Severity: moderate
New Weakness: LUE, LLE, left facial
HPI:
Patient presents for evaluation of a left facial droop and slurred speech since 
Monday.
 
Past History
 
Travel History
Traveled to [place] past 21 day No
 
Medical History
Any Pertinent Medical History? see below for history
Neurological: NONE
EENT: NONE
Cardiovascular: hypertension
Respiratory: NONE
Gastrointestinal: NONE
Hepatic: NONE
Renal: NONE
Musculoskeletal: NONE
Psychiatric: NONE
Endocrine: PREDIABETES
Blood Disorders: NONE
Cancer(s): NONE
GYN/Reproductive: NONE
 
Surgical History
Surgical History: non-contributory
 
Psychosocial History
What is your primary language English
Tobacco Use: Never used
ETOH Use: denies use
 
Family History
Hx Contributory? No
 
Review of Systems
 
Review of Systems
Constitutional:
Reports: no symptoms. 
EENTM:
Reports: no symptoms. 
Respiratory:
Reports: no symptoms. 
Cardiovascular:
Reports: no symptoms. 
GI:
Reports: no symptoms. 
Genitourinary:
Reports: no symptoms. 
Musculoskeletal:
Reports: no symptoms. 
Skin:
Reports: no symptoms. 
Neurological/Psychological:
Reports: no symptoms. 
Hematologic/Endocrine:
Reports: no symptoms. 
Immunologic/Allergic:
Reports: no symptoms. 
All Other Systems: Reviewed and Negative
 
Physical Exam
 
Physical Exam
General Appearance: see below
Cranial Nerves: see below
Comments:
Gen.: Well-nourished, well-developed, no acute respiratory distress.
Head: Normocephalic, atraumatic.
Eyes: Normal inspection bilaterally, PERRLA, EOMI
Ears: Normal inspection bilaterally
Nose: Normal inspection
Throat/mouth : Moist mucosa 
Neck: Supple, full range of motion, no goiter, no carotid bruits, equal carotid 
pulses
Heart: Regular rate and rhythm, no murmurs rubs or gallops
Lungs: Clear to auscultation bilaterally with normal air entry
Chest: Nontender
Back: Normal range of motion
Abdomen: Nondistended, normal bowel sounds
Extremities: Normal range of motion grossly, equal radial pulses, no cyanosis 
clubbing or edema, mild dysmetria of the left upper extremity, mild weakness of 
the left hand grasp and left foot plantar flexion.
Neurologic: Cranial nerves : Weakness of the left face with decreased sensation 
to light touch, right tongue deviation, left eye prosthesis
Skin: warm and dry
Psychiatric: Calm, cooperative, no apparent delusions or hallucinations
 
Core Measures
CVA/TIA Diagnosis: Yes
NIH Stroke Scale
 
 
NIH Stroke Scale Response Value
 
Level of Consciousness alert 0
 
LOC Questions answers both correctly 0
 
LOC Commands obeys both correctly 0
 
Best Gaze normal 0
 
Visual Fields no visual loss 0
 
Facial Paresis minor 1
 
Motor Arm - Left drift 1
 
Motor Arm - Right no drift 0
 
Motor Leg - Left drift 1
 
Motor Leg - Right no drift 0
 
Limb Ataxia present in one limb 1
 
Sensory partial loss 1
 
Best Language no aphasia 0
 
Dysarthria mild/mod slurring words 1
 
Extinction and Inattention no neglect 0
 
Total   6
 
 
Swallow Evaluation Pass
Swallow eval date 18
Swallow eval time 1740
Sepsis Present: No
Sepsis Focused Exam Completed? No
 
Progress
Differential Diagnosis: CVA, Bell's palsy, intracranial mass, hypoglycemia
Plan of Care:
 Orders
 
 
Procedure Date/time Status
 
Consistent Carbohydrate 1  B Active
 
Patient Data  1749 Active
 
Misc Message 1728 Active
 
ED Holding Orders 1728 Active
 
Vital Signs 1728 Active
 
Code Status 1728 Active
 
Place in observation 1726 Active
 
TROPONIN LEVEL  144 Complete
 
COMPREHENSIVE METABOLIC PANEL 1444 Complete
 
CBC WITHOUT DIFFERENTIAL 1444 Complete
 
EKG 1444 Active
 
 
 Laboratory Tests
 
 
 
18 1445:
Anion Gap 11, Estimated GFR 45  L, BUN/Creatinine Ratio 10.8, Glucose 121  H, 
Calcium 9.2, Total Bilirubin 0.6, AST 22, ALT 34, Alkaline Phosphatase 75, 
Troponin I < 0.01, Total Protein 7.1, Albumin 3.6, Globulin 3.5, Albumin/
Globulin Ratio 1.0  L, CBC w Diff NO MAN DIFF REQ, RBC 4.45, MCV 90.1, MCH 29.8,
MCHC 33.1, RDW 13.8, MPV 9.8, Gran % 37.4  L, Lymphocytes % 53.0  H, Monocytes %
6.4, Eosinophils % 2.7, Basophils % 0.5, Absolute Granulocytes 2.6, Absolute 
Lymphocytes 3.7  H, Absolute Monocytes 0.4, Absolute Eosinophils 0.2, Absolute 
Basophils 0
 
Diagnostic Imaging:
Discussed w/RAD: CT Scan. 
Radiology Impression: PATIENT: ZARINA ANDINO  MEDICAL RECORD NO: [id number] PRESENT 
AGE: 63  PATIENT ACCOUNT NO: [id number] : 55  LOCATION: [facility] ORDERING 
PHYSICIAN: Siddharth MORILLO     SERVICE DATE:  EXAM TYPE: CAT - CT 
HEAD WO IV CONTRAST EXAMINATION: CT HEAD WITHOUT CONTRAST CLINICAL INFORMATION: 
Intracranial hemorrhage, mass. Slurred speech COMPARISON: None TECHNIQUE: 
Contiguous axial imaging was performed from the skull base to vertex without 
intravenous administration of contrast. DLP: 615 mGy-cm FINDINGS: There is no 
evidence of acute intracranial hemorrhage or territorial infarction. No abnormal
mass effect or midline shift is seen. Gray to white matter differentiation is 
well preserved. No extra-axial fluid collections are identified. The ventricles 
are normal in size. There is no abnormal attenuation within the brain 
parenchyma. The osseous structures and soft tissues are normal. The mastoid air 
cells and visualized portions of the paranasal sinuses are well aerated. 
IMPRESSION: No acute intracranial pathology. DICTATED BY: Guevara Slater MD  
DATE/TIME DICTATED:18 :RAD.GIL  DATE/TIME 
TRANSCRIBED:18 CONFIDENTIAL, DO NOT COPY WITHOUT APPROPRIATE 
AUTHORIZATION.  <Electronically signed in Other Vendor System>                  
                                                                     SIGNED BY: 
Guevara Slater MD 18
Initial ED EKG: NSR, rate (66), lad
Comments:
2018 4:49:28 PM patient's case discussed with Dr. Corrales and case 
management. 
 
Departure
 
Departure
Disposition: STILL A PATIENT
Condition: Stable
Clinical Impression
Primary Impression: CVA (cerebral vascular accident)
Qualifiers:  CVA mechanism: unspecified Qualified Code: I63.9 - Cerebral 
infarction, unspecified
Referrals:
Jessica XIE,Myl (PCP/Family)
 
Departure Forms:
Customer Survey
General Discharge Information
 
Observation Note
Spoke With:
Sin XIE,Yoko
Place Patient In: Non-ED OBS Care Area
Rationale for Observation:
My rational for observation is as follows: Patient has suffered a stroke with 
persistent left facial droop and left-sided weakness and slurred speech over the
past 4 days.  I feel she requires an expedited evaluation of possible reversible
causes of stroke including cardiac embolism/valve disease and carotid artery 
disease.  In addition I feel the patient should be placed on a cardiac monitor 
to assess for intermittent atrial fibrillation.  She should have a neurology 
consultation for optimization of her medications.  Follow-up MRI should be 
considered. Per MD, drains are being managed by Dr. Galindo. Patient will call Dr. Galindo.